# Patient Record
(demographics unavailable — no encounter records)

---

## 2024-12-18 NOTE — REASON FOR VISIT
[Follow-Up] : a follow-up visit for [Transposition of the Great Vessels] : transposition of the great vessels [Ventricular Septal Defect] : a ventricular septal defect [Parents] : parents

## 2024-12-23 NOTE — CARDIOLOGY SUMMARY
[LVSF ___%] : LV Shortening Fraction [unfilled]% [Today's Date] : [unfilled] [FreeTextEntry1] : Normal Sinus Rhythm with sinus arrhythmia Right Axis Deviation Right Bundle Branch Block QTc 406-433 ms  [de-identified] : 12/18/2024 [FreeTextEntry2] : Summary: 1. 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 2. Status post ventricular septal defect repair. 3. No residual ventricular septal defect. 4. Status post primary closure of interatrial septal defect. 5. No residual interatrial shunt identified. 6. The Main Pulmonary Artery has discrete narrowing. 7. Moderate supravalvar pulmonary stenosis. 8. Peak pulmonary valve gradient = 52.5 mmHg (mean grad = 31.4 mmHg). 9. Pulmonary valve gradients are actually right ventricular outflow gradients as they include the supravalvar stenosis. 10. Trivial neoaortic regurgitation. 11. 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 12. Right ventricular pressure elevated at 1/2+ systemic level. 13. Mild tricuspid valve regurgitation, peak systolic instantaneous gradient 47.6 mmHg. 14. Trivial mitral valve regurgitation. 15. Normal left ventricular size, morphology and systolic function. 16. No evidence of left ventricular outflow tract obstruction. 17. No pericardial effusion.  . [de-identified] : The results of the 24-hour Holter monitor placed at last visit reviewed in detail today. The heart rate ranged from  beats per minute with an average of 81 beats per minute. The predominant rhythm was normal sinus rhythm alternating with sinus bradycardia, sinus tachycardia and sinus arrhythmia. There were 2 supraventricular premature beats. There were no ventricular premature beats. There were no symptoms reported during the monitoring period. [de-identified] : 9/15/2021 [FreeTextEntry3] : done at [facility]. MPA gradient 30-35 mmHg.No branch pulmonary artery stenosis. RVp 2/3rd systemic. No intervention done due to the location of massimo-supravalvlar main pulmonary artery obstruction.

## 2024-12-23 NOTE — HISTORY OF PRESENT ILLNESS
[FreeTextEntry1] : MARIE presented for follow up on Dec 18, 2024. She was last evaluated on   Mar 20, 2024.   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 She has complete repair on 8/26/19 and was discharged on 9/11/2019.   She has been thriving at home, has been feeding without difficulty, and has been gaining weight and developing appropriately. There has been no tachypnea, increased work of breathing, cyanosis, diaphoresis, unexplained irritability, or syncope.   Ther have been no other interim medical problems. No recent episodes of croup.  On 12/3/2020 she had a sedated echo and MRI [place] [facility] [place] her massimo-supravalvar pulmonary stenosis. The MRI revealed: D- transposition of great arteries with ventricular septal defect s/p repair with arterial switch operation and [place] maneuver. Neopulmonary artery is anterior to the massimo aorta with the branch pulmonary arteries draped over the ascending aorta as expected with Branden procedure. The neopulmonary artery is a very short segment but measures within normal range (z: -1.23 for a mean dimension of 1.05 cm in cross section). Trivial neopulmonary insufficiency (RF:  8%). Mild decrease in caliber distally with dephasing jet artifact starting in the supravalvar region - pre bifurcation (likely site of anastomosis) that extends into the proximal right pulmonary artery. Mild to moderate proximal narrowing of the right pulmonary artery (z: -2.32 taking a mean dimension of 0.5 cm) with a slight increase in caliber distally. The left pulmonary artery also shows a mild increase in caliber distally as compared to the proximal portion with no discrete stenosis (z: 0.39 taking a mean dimension of 0.8 cm in the mid portion and z: -0.43 taking a mean dimension of 0.7 cm proximally). (All measurements given above). Estimated QpR: QpL is 24%: 76%. Mildly dilated neoaortic root (z: 2.67 taking a maximal dimension of 1.81 cm from sinus to commissure in systole, using [place] data set). No significant massimo aortic insufficiency. Patent origins of the reimplanted right and left coronary arteries. Normal biventricular volumes and ejection fractions.  The family went for second opinion to see if the supravalvar pulmonic stenosis could be treated via interventional cardiology. MARIE had her cardiac catheterization done at [facility] on 9/15/2021. The obstruction was found not amenable to cardiac catheter intervention. They advised not proceeding to surgery.   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 She also has left upper abdomen wound dehiscence, mid-back pressure ulcer, and right neck ulcerated wound.  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   She is being followed by Developmental Pediatrician.  She was last evaluated in August 2024.   MARIE has not been diagnosed with COVID-19 but has known exposure to the virus through dad.   There is no other family history of congenital heart disease, cardiomyopathies, arrhythmias, genetic heart disease or sudden cardiac death.

## 2024-12-23 NOTE — REVIEW OF SYSTEMS
[Nasal Stuffiness] : nasal congestion [Feeling Poorly] : not feeling poorly (malaise) [Fever] : no fever [Wgt Loss (___ Lbs)] : no recent weight loss [Pallor] : not pale [Eye Discharge] : no eye discharge [Redness] : no redness [Change in Vision] : no change in vision [Sore Throat] : no sore throat [Earache] : no earache [Loss Of Hearing] : no hearing loss [Nosebleeds] : no epistaxis [Cyanosis] : no cyanosis [Edema] : no edema [Diaphoresis] : not diaphoretic [Chest Pain] : no chest pain or discomfort [Exercise Intolerance] : no persistence of exercise intolerance [Palpitations] : no palpitations [Orthopnea] : no orthopnea [Fast HR] : no tachycardia [Tachypnea] : not tachypneic [Wheezing] : no wheezing [Cough] : no cough [Shortness Of Breath] : not expressed as feeling short of breath [Being A Poor Eater] : not a poor eater [Vomiting] : no vomiting [Diarrhea] : no diarrhea [Decrease In Appetite] : appetite not decreased [Abdominal Pain] : no abdominal pain [Fainting (Syncope)] : no fainting [Seizure] : no seizures [Headache] : no headache [Dizziness] : no dizziness [Limping] : no limping [Joint Pains] : no arthralgias [Joint Swelling] : no joint swelling [Rash] : no rash [Wound problems] : no wound problems [Skin Peeling] : no skin peeling [Easy Bruising] : no tendency for easy bruising [Swollen Glands] : no lymphadenopathy [Easy Bleeding] : no ~M tendency for easy bleeding [Sleep Disturbances] : ~T no sleep disturbances [Hyperactive] : no hyperactive behavior [Failure To Thrive] : no failure to thrive [Short Stature] : short stature was not noted [Jitteriness] : no jitteriness [Heat/Cold Intolerance] : no temperature intolerance [Dec Urine Output] : no oliguria

## 2024-12-23 NOTE — DISCUSSION/SUMMARY
[May participate in all age-appropriate activities] : [unfilled] May participate in all age-appropriate activities. [Influenza vaccine is recommended] : Influenza vaccine is recommended [FreeTextEntry1] : In summary MARIE 's work revealed:  Summary: 1. 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 2. Status post primary closure of interatrial septal defect. 3. Status post ventricular septal defect repair. 4. 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 5. No residual interatrial shunt identified. 6. No residual ventricular septal defect. 7. No evidence of left ventricular outflow tract obstruction. 8. Moderate supravalvar pulmonary stenosis. 9. Pulmonary valve gradients are actually right ventricular outflow gradients as they include the supravalvar stenosis. PSIG was 49.80 (averaged over multiple measurements from multiple views) and mean was 27.4. (Prior were 52.53 and 31.4) This is essentially unchanged.  10. The Main Pulmonary Artery has discrete narrowing. 11. Mild tricuspid valve regurgitation, peak systolic instantaneous gradient 39.0 mmHg. 12. Right ventricular pressure elevated at 1/2+ systemic level. 13. Mild mitral valve regurgitation. 14. Trivial neoaortic regurgitation.  Given the extensive cardiac surgery and RBBB on EKG she is at risk for arrhythmias. Her Holter was normal   To review: Her cardiac cath at [facility] on 9/15/2021 were consistent with these findings. As noted at prior visits, she will likely  need cardiac surgery for  RVOT obstruction in the future. However her RV pressures remain less than systemic so this can be delayed. I reached out to our Interventional cardiology  by Dr Jones to review the echo to see if he thought there may have been growth of the MPA for possible balloon/stent. He explained "Seems like the pulmonary valve/RVOT gradient has been relatively stable over time.  I think it would be reasonable to consider a CTA +/- lung perfusion scan vs repeat MRI at some point in the medium term to re-assess the MPA length and relationship to the RPA as the RPA flow was diminished on the MRI (24%).  Could then have a conversation about need for intervention - would certainly be easier now that she 4 years old"  We will consider this at her next visit when she is 5 years old .  I reminded parents that she needs to continue to follow up with developmental pediatrics.  She should continue with her routine pediatric care.   I would like to see KYARA for follow-up in 6 month, earlier as clinically indicated.  [Needs SBE Prophylaxis] : [unfilled] does not need bacterial endocarditis prophylaxis

## 2024-12-23 NOTE — DISCUSSION/SUMMARY
[May participate in all age-appropriate activities] : [unfilled] May participate in all age-appropriate activities. [Influenza vaccine is recommended] : Influenza vaccine is recommended [FreeTextEntry1] : In summary MARIE 's work revealed:  Summary: 1. 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 2. Status post primary closure of interatrial septal defect. 3. Status post ventricular septal defect repair. 4. 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 5. No residual interatrial shunt identified. 6. No residual ventricular septal defect. 7. No evidence of left ventricular outflow tract obstruction. 8. Moderate supravalvar pulmonary stenosis. 9. Pulmonary valve gradients are actually right ventricular outflow gradients as they include the supravalvar stenosis. PSIG was 49.80 (averaged over multiple measurements from multiple views) and mean was 27.4. (Prior were 52.53 and 31.4) This is essentially unchanged.  10. The Main Pulmonary Artery has discrete narrowing. 11. Mild tricuspid valve regurgitation, peak systolic instantaneous gradient 39.0 mmHg. 12. Right ventricular pressure elevated at 1/2+ systemic level. 13. Mild mitral valve regurgitation. 14. Trivial neoaortic regurgitation.  Given the extensive cardiac surgery and RBBB on EKG she is at risk for arrhythmias. Her Holter was normal   To review: Her cardiac cath at [facility] on 9/15/2021 were consistent with these findings. As noted at prior visits, she will likely  need cardiac surgery for  RVOT obstruction in the future. However her RV pressures remain less than systemic so this can be delayed. I reached out to our Interventional cardiology  by Dr Jones to review the echo to see if he thought there may have been growth of the MPA for possible balloon/stent. He explained "Seems like the pulmonary valve/RVOT gradient has been relatively stable over time.  I think it would be reasonable to consider a CTA +/- lung perfusion scan vs repeat MRI at some point in the medium term to re-assess the MPA length and relationship to the RPA as the RPA flow was diminished on the MRI (24%).  Could then have a conversation about need for intervention - would certainly be easier now that she 4 years old"  We will consider this at her next visit when she is 5 years old .  I reminded parents that she needs to continue to follow up with developmental pediatrics.  She should continue with her routine pediatric care.   I would like to see KYARA for follow-up in 6 month, earlier as clinically indicated.  [Needs SBE Prophylaxis] : [unfilled] does not need bacterial endocarditis prophylaxis

## 2024-12-23 NOTE — PHYSICAL EXAM
[General Appearance - Alert] : alert [General Appearance - In No Acute Distress] : in no acute distress [General Appearance - Well Nourished] : well nourished [General Appearance - Well Developed] : well developed [General Appearance - Well-Appearing] : well appearing [Appearance Of Head] : the head was normocephalic [Facies] : there were no dysmorphic facial features [Sclera] : the conjunctiva were normal [PERRL With Normal Accommodation] : the pupils were equal in size, round, and reactive to light [Outer Ear] : the ears and nose were normal in appearance [Nasal Cavity] : the nasal mucosa was normal [Oropharynx] : the oropharynx was normal [Respiration, Rhythm And Depth] : normal respiratory rhythm and effort [Auscultation Breath Sounds / Voice Sounds] : breath sounds clear to auscultation bilaterally [No Cough] : no cough [Stridor] : no stridor was observed [Chest Visual Inspection Thoracic Deformity] : no chest wall deformity [Apical Impulse] : quiet precordium with normal apical impulse [Heart Rate And Rhythm] : normal heart rate and rhythm [Heart Sounds] : normal S1 and S2 [Heart Sounds Gallop] : no gallops [Heart Sounds Pericardial Friction Rub] : no pericardial rub [Heart Sounds Click] : no clicks [Arterial Pulses] : normal upper and lower extremity pulses with no pulse delay [Edema] : no edema [Capillary Refill Test] : normal capillary refill [Systolic] : systolic [III] : a grade 3/6   [LMSB] : LMSB  [LUSB] : LUSB [Ejection] : ejection [Low] : low pitched [Harsh] : harsh [Early] : early [Back] : the murmur was transmitted to the back [Bowel Sounds] : normal bowel sounds [Abdomen Soft] : soft [Nondistended] : nondistended [Abdomen Tenderness] : non-tender [Musculoskeletal - Swelling] : no joint swelling seen [Nail Clubbing] : no clubbing  or cyanosis of the fingers [Musculoskeletal - Tenderness] : no joint tenderness was elicited [Musculoskeletal Exam: Normal Movement Of All Extremities] : normal movements of all extremities [Cervical Lymph Nodes Enlarged Anterior] : The anterior cervical nodes were normal [] : no rash [Skin Lesions] : no lesions [Skin Turgor] : normal turgor [Skin Color & Pigmentation] : normal skin color and pigmentation [Mood] : mood and affect were appropriate for age [Normal Chest Appearance] : the chest was normal in appearance [Chest Palpation Tender Sternum] : no chest wall tenderness [Sternotomy] : sternotomy [Clean] : clean [Dry] : dry [Well-Healed] : well-healed [Flat/Soft] : flat and soft [Motor Tone] : normal tone [FreeTextEntry1] : She has a scar on her back

## 2024-12-23 NOTE — CARDIOLOGY SUMMARY
[LVSF ___%] : LV Shortening Fraction [unfilled]% [Today's Date] : [unfilled] [FreeTextEntry1] : Normal Sinus Rhythm with sinus arrhythmia Right Axis Deviation Right Bundle Branch Block QTc 406-433 ms  [de-identified] : 12/18/2024 [FreeTextEntry2] : Summary: 1. 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 2. Status post ventricular septal defect repair. 3. No residual ventricular septal defect. 4. Status post primary closure of interatrial septal defect. 5. No residual interatrial shunt identified. 6. The Main Pulmonary Artery has discrete narrowing. 7. Moderate supravalvar pulmonary stenosis. 8. Peak pulmonary valve gradient = 52.5 mmHg (mean grad = 31.4 mmHg). 9. Pulmonary valve gradients are actually right ventricular outflow gradients as they include the supravalvar stenosis. 10. Trivial neoaortic regurgitation. 11. 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 12. Right ventricular pressure elevated at 1/2+ systemic level. 13. Mild tricuspid valve regurgitation, peak systolic instantaneous gradient 47.6 mmHg. 14. Trivial mitral valve regurgitation. 15. Normal left ventricular size, morphology and systolic function. 16. No evidence of left ventricular outflow tract obstruction. 17. No pericardial effusion.  . [de-identified] : The results of the 24-hour Holter monitor placed at last visit reviewed in detail today. The heart rate ranged from  beats per minute with an average of 81 beats per minute. The predominant rhythm was normal sinus rhythm alternating with sinus bradycardia, sinus tachycardia and sinus arrhythmia. There were 2 supraventricular premature beats. There were no ventricular premature beats. There were no symptoms reported during the monitoring period. [de-identified] : 9/15/2021 [FreeTextEntry3] : done at [facility]. MPA gradient 30-35 mmHg.No branch pulmonary artery stenosis. RVp 2/3rd systemic. No intervention done due to the location of massimo-supravalvlar main pulmonary artery obstruction.

## 2024-12-23 NOTE — HISTORY OF PRESENT ILLNESS
[FreeTextEntry1] : MARIE presented for follow up on Dec 18, 2024. She was last evaluated on   Mar 20, 2024.   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 She has complete repair on 8/26/19 and was discharged on 9/11/2019.   She has been thriving at home, has been feeding without difficulty, and has been gaining weight and developing appropriately. There has been no tachypnea, increased work of breathing, cyanosis, diaphoresis, unexplained irritability, or syncope.   Ther have been no other interim medical problems. No recent episodes of croup.  On 12/3/2020 she had a sedated echo and MRI and [facility] recommended surgery for her massimo-supravalvar pulmonary stenosis. The MRI revealed: D- transposition of great arteries with ventricular septal defect s/p repair with arterial switch operation and [place] maneuver. Neopulmonary artery is anterior to the massimo aorta with the branch pulmonary arteries draped over the ascending aorta as expected with Branden procedure. The neopulmonary artery is a very short segment but measures within normal range (z: -1.23 for a mean dimension of 1.05 cm in cross section). Trivial neopulmonary insufficiency (RF:  8%). Mild decrease in caliber distally with dephasing jet artifact starting in the supravalvar region - pre bifurcation (likely site of anastomosis) that extends into the proximal right pulmonary artery. Mild to moderate proximal narrowing of the right pulmonary artery (z: -2.32 taking a mean dimension of 0.5 cm) with a slight increase in caliber distally. The left pulmonary artery also shows a mild increase in caliber distally as compared to the proximal portion with no discrete stenosis (z: 0.39 taking a mean dimension of 0.8 cm in the mid portion and z: -0.43 taking a mean dimension of 0.7 cm proximally). (All measurements given above). Estimated QpR: QpL is 24%: 76%. Mildly dilated neoaortic root (z: 2.67 taking a maximal dimension of 1.81 cm from sinus to commissure in systole, using [place] data set). No significant massimo aortic insufficiency. Patent origins of the reimplanted right and left coronary arteries. Normal biventricular volumes and ejection fractions.  The family went for second opinion to see if the supravalvar pulmonic stenosis could be treated via interventional cardiology. MARIE had her cardiac catheterization done at [facility] on 9/15/2021. The obstruction was found not amenable to cardiac catheter intervention. They advised not proceeding to surgery.   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 She also has left upper abdomen wound dehiscence, mid-back pressure ulcer, and right neck ulcerated wound.  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   She is being followed by Developmental Pediatrician.  She was last evaluated in August 2024.   MARIE has not been diagnosed with COVID-19 but has known exposure to the virus through dad.   There is no other family history of congenital heart disease, cardiomyopathies, arrhythmias, genetic heart disease or sudden cardiac death.

## 2024-12-23 NOTE — CONSULT LETTER
[Today's Date] : [unfilled] [Name] : Name: [unfilled] [] : : ~~ [Today's Date:] : [unfilled] [Dear  ___:] : Dear Dr. [unfilled]: [Consult] : I had the pleasure of evaluating your patient, [unfilled]. My full evaluation follows. [Consult - Single Provider] : Thank you very much for allowing me to participate in the care of this patient. If you have any questions, please do not hesitate to contact me. [Sincerely,] : Sincerely, [FreeTextEntry4] : Padmini Rios, DO [FreeTextEntry5] : [address] [facility] [FreeTextEntry6] : [place]  [id number] [de-identified] : Barry E. Goldberg MD, FACC, FAAP, FASE\par  [facility]\par  [facility] [place] [facility] \par  Chief of Pediatric Cardiology\par

## 2024-12-23 NOTE — CONSULT LETTER
[Today's Date] : [unfilled] [Name] : Name: [unfilled] [] : : ~~ [Today's Date:] : [unfilled] [Dear  ___:] : Dear Dr. [unfilled]: [Consult] : I had the pleasure of evaluating your patient, [unfilled]. My full evaluation follows. [Consult - Single Provider] : Thank you very much for allowing me to participate in the care of this patient. If you have any questions, please do not hesitate to contact me. [Sincerely,] : Sincerely, [FreeTextEntry4] : Padmini Rios, DO [FreeTextEntry5] : [address] [facility] [FreeTextEntry6] : [place]  [id number] [de-identified] : Barry E. Goldberg MD, FACC, FAAP, FASE\par  [facility]\par  [facility] [place] [facility] \par  Chief of Pediatric Cardiology\par

## 2025-06-11 NOTE — CONSULT LETTER
[Today's Date] : [unfilled] [Name] : Name: [unfilled] [] : : ~~ [Today's Date:] : [unfilled] [Dear  ___:] : Dear Dr. [unfilled]: [Consult] : I had the pleasure of evaluating your patient, [unfilled]. My full evaluation follows. [Consult - Single Provider] : Thank you very much for allowing me to participate in the care of this patient. If you have any questions, please do not hesitate to contact me. [Sincerely,] : Sincerely, [FreeTextEntry4] : Padmini Rios, DO [FreeTextEntry5] : [address] [facility] [FreeTextEntry6] : [place]  [id number] [de-identified] : Barry E. Goldberg MD, FACC, FAAP, FASE\par  [facility]\par  [facility] [place] [facility] \par  Chief of Pediatric Cardiology\par

## 2025-06-11 NOTE — HISTORY OF PRESENT ILLNESS
[FreeTextEntry1] : MARIE presented for follow up on Jun 11, 2025. She was last evaluated on Dec 18, 2024..   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 She has complete repair on 8/26/19 and was discharged on 9/11/2019.   She went through a spring season of soccer without problems. She di field day without dyspnea on exertion.  She had URI and strep since last visit.  She has been otherwise thriving at home, has been feeding without difficulty, and has been gaining weight and developing appropriately. There has been no tachypnea, increased work of breathing, cyanosis, diaphoresis, unexplained irritability, or syncope.   Ther have been no other interim medical problems. No recent episodes of croup.  On 12/3/2020 [place] had a sedated echo and [place] [facility] [place] her massimo-supravalvar pulmonary stenosis. 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 Neopulmonary artery is anterior to the massimo aorta with the branch pulmonary arteries draped over the ascending aorta as expected with Branden procedure. The neopulmonary artery is a very short segment but measures within normal range (z: -1.23 for a mean dimension of 1.05 cm in cross section). Trivial neopulmonary insufficiency (RF:  8%). Mild decrease in caliber distally with dephasing jet artifact starting in the supravalvar region - pre bifurcation (likely site of anastomosis) that extends into the proximal right pulmonary artery. Mild to moderate proximal narrowing of the right pulmonary artery (z: -2.32 taking a mean dimension of 0.5 cm) with a slight increase in caliber distally. The left pulmonary artery also shows a mild increase in caliber distally as compared to the proximal portion with no discrete stenosis (z: 0.39 taking a mean dimension of 0.8 cm in the mid portion and z: -0.43 taking a mean dimension of 0.7 cm proximally). (All measurements given above). Estimated QpR: QpL is 24%: 76%. Mildly dilated neoaortic root (z: 2.67 taking a maximal dimension of 1.81 cm from sinus to commissure in systole, using [place] data set). No significant massimo aortic insufficiency. Patent origins of the reimplanted right and left coronary arteries. Normal biventricular volumes and ejection fractions.  The family went for second opinion to see if the supravalvar pulmonic stenosis could be treated via interventional cardiology. MARIE had her cardiac catheterization done at [facility] on 9/15/2021. The obstruction was found not amenable to cardiac catheter intervention. They advised not proceeding to surgery.   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 She also has left upper abdomen wound dehiscence, mid-back pressure ulcer, and right neck ulcerated wound.  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   She is being followed by Developmental Pediatrician.  She was last evaluated in August 2024.  She is in  without any issues.  MARIE has not been diagnosed with COVID-19 but has known exposure to the virus through dad.   Dad was diagnosed with SVT and was placed on medications. There is no other family history of congenital heart disease, cardiomyopathies, arrhythmias, genetic heart disease or sudden cardiac death.

## 2025-06-11 NOTE — HISTORY OF PRESENT ILLNESS
[FreeTextEntry1] : MARIE presented for follow up on Jun 11, 2025. She was last evaluated on Dec 18, 2024..   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 She has complete repair on 8/26/19 and was discharged on 9/11/2019.   She went through a spring season of soccer without problems. She di field day without dyspnea on exertion.  She had URI and strep since last visit.  She has been otherwise thriving at home, has been feeding without difficulty, and has been gaining weight and developing appropriately. There has been no tachypnea, increased work of breathing, cyanosis, diaphoresis, unexplained irritability, or syncope.   Ther have been no other interim medical problems. No recent episodes of croup.  On 12/3/2020 she had a sedated echo and MRI and [facility] [place] surgery for her massimo-supravalvar pulmonary stenosis. 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 Neopulmonary artery is anterior to the massimo aorta with the branch pulmonary arteries draped over the ascending aorta as expected with Branden procedure. The neopulmonary artery is a very short segment but measures within normal range (z: -1.23 for a mean dimension of 1.05 cm in cross section). Trivial neopulmonary insufficiency (RF:  8%). Mild decrease in caliber distally with dephasing jet artifact starting in the supravalvar region - pre bifurcation (likely site of anastomosis) that extends into the proximal right pulmonary artery. Mild to moderate proximal narrowing of the right pulmonary artery (z: -2.32 taking a mean dimension of 0.5 cm) with a slight increase in caliber distally. The left pulmonary artery also shows a mild increase in caliber distally as compared to the proximal portion with no discrete stenosis (z: 0.39 taking a mean dimension of 0.8 cm in the mid portion and z: -0.43 taking a mean dimension of 0.7 cm proximally). (All measurements given above). Estimated QpR: QpL is 24%: 76%. Mildly dilated neoaortic root (z: 2.67 taking a maximal dimension of 1.81 cm from sinus to commissure in systole, using [place] data set). No significant massimo aortic insufficiency. Patent origins of the reimplanted right and left coronary arteries. Normal biventricular volumes and ejection fractions.  The family went for second opinion to see if the supravalvar pulmonic stenosis could be treated via interventional cardiology. MARIE had her cardiac catheterization done at [facility] on 9/15/2021. The obstruction was found not amenable to cardiac catheter intervention. They advised not proceeding to surgery.   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 She also has left upper abdomen wound dehiscence, mid-back pressure ulcer, and right neck ulcerated wound.  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   She is being followed by Developmental Pediatrician.  She was last evaluated in August 2024.  She is in  without any issues.  MARIE has not been diagnosed with COVID-19 but has known exposure to the virus through dad.   Dad was diagnosed with SVT and was placed on medications. There is no other family history of congenital heart disease, cardiomyopathies, arrhythmias, genetic heart disease or sudden cardiac death.

## 2025-06-11 NOTE — DISCUSSION/SUMMARY
[May participate in all age-appropriate activities] : [unfilled] May participate in all age-appropriate activities. [Influenza vaccine is recommended] : Influenza vaccine is recommended [FreeTextEntry1] : In summary MARIE 's work revealed:  Summary: 1. 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 2. Status post primary closure of interatrial septal defect. 3. Status post ventricular septal defect repair. 4. 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 5. No residual interatrial shunt identified. 6. No residual ventricular septal defect. 7. No evidence of left ventricular outflow tract obstruction. 8. Moderate supravalvar pulmonary stenosis. 9. Pulmonary valve gradients are actually right ventricular outflow gradients as they include the supravalvar stenosis. PSIG was 49.80 (averaged over multiple measurements from multiple views) and mean was 27.4. (Prior were 52.53 and 31.4) This is essentially unchanged.  10. The Main Pulmonary Artery has discrete narrowing. 11. Mild tricuspid valve regurgitation, peak systolic instantaneous gradient 39.0 mmHg. 12. Right ventricular pressure elevated at 1/2+ systemic level. 13. Mild mitral valve regurgitation. 14. Trivial neoaortic regurgitation.  Given the extensive cardiac surgery and RBBB on EKG she is at risk for arrhythmias. Her Holter was normal   To review: Her cardiac cath at [facility] on 9/15/2021 were consistent with these findings. As noted at prior visits, she will likely  need cardiac surgery for  RVOT obstruction in the future. However her RV pressures remain less than systemic so this can be delayed. I reached out to our Interventional cardiology  by Dr Jones to review the echo to see if he thought there may have been growth of the MPA for possible balloon/stent. He explained "Seems like the pulmonary valve/RVOT gradient has been relatively stable over time.  I think it would be reasonable to consider a CTA +/- lung perfusion scan vs repeat MRI at some point in the medium term to re-assess the MPA length and relationship to the RPA as the RPA flow was diminished on the MRI (24%).  Could then have a conversation about need for intervention - would certainly be easier now that she 4 years old"  We will consider this at her next visit when she is 5 years old .  I reminded parents that she needs to continue to follow up with developmental pediatrics.  She should continue with her routine pediatric care.   I would like to see KYARA for follow-up in 6 month, earlier as clinically indicated.  [Needs SBE Prophylaxis] : [unfilled] does not need bacterial endocarditis prophylaxis

## 2025-06-11 NOTE — CARDIOLOGY SUMMARY
[Today's Date] : [unfilled] [FreeTextEntry1] : Normal Sinus Rhythm with sinus arrhythmia Right Axis Deviation Right Bundle Branch Block QTc 406-433 ms  [de-identified] : 12/18/2024 [FreeTextEntry2] : Summary: 1. D-TGA (transposition of the great arteries) with ventricular septal defect, status post arterial switch operation with [place] maneuver. 2. Status post ventricular septal defect repair. 3. No residual ventricular septal defect. 4. Status post primary closure of interatrial septal defect. 5. No residual interatrial shunt identified. 6. The Main Pulmonary Artery has discrete narrowing. 7. Moderate supravalvar pulmonary stenosis. 8. Peak pulmonary valve gradient = 52.5 mmHg (mean grad = 31.4 mmHg). 9. Pulmonary valve gradients are actually right ventricular outflow gradients as they include the supravalvar stenosis. 10. Trivial neoaortic regurgitation. 11. 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 12. Right ventricular pressure elevated at 1/2+ systemic level. 13. Mild tricuspid valve regurgitation, peak systolic instantaneous gradient 47.6 mmHg. 14. Trivial mitral valve regurgitation. 15. Normal left ventricular size, morphology and systolic function. 16. No evidence of left ventricular outflow tract obstruction. 17. No pericardial effusion.  . [de-identified] : The results of the 24-hour Holter monitor placed at last visit reviewed in detail today. The heart rate ranged from  beats per minute with an average of 81 beats per minute. The predominant rhythm was normal sinus rhythm alternating with sinus bradycardia, sinus tachycardia and sinus arrhythmia. There were 2 supraventricular premature beats. There were no ventricular premature beats. There were no symptoms reported during the monitoring period. [de-identified] : 9/15/2021 [FreeTextEntry3] : done at [facility]. MPA gradient 30-35 mmHg.No branch pulmonary artery stenosis. RVp 2/3rd systemic. No intervention done due to the location of massimo-supravalvlar main pulmonary artery obstruction.

## 2025-06-11 NOTE — CARDIOLOGY SUMMARY
[Today's Date] : [unfilled] [FreeTextEntry1] : Normal Sinus Rhythm with sinus arrhythmia Right Axis Deviation Right Bundle Branch Block QTc 406-433 ms  [de-identified] : 12/18/2024 [FreeTextEntry2] : Summary: 1. D-TGA (transposition of the great arteries) with ventricular septal defect, status post arterial switch operation with [place] maneuver. 2. Status post ventricular septal defect repair. 3. No residual ventricular septal defect. 4. Status post primary closure of interatrial septal defect. 5. No residual interatrial shunt identified. 6. The Main Pulmonary Artery has discrete narrowing. 7. Moderate supravalvar pulmonary stenosis. 8. Peak pulmonary valve gradient = 52.5 mmHg (mean grad = 31.4 mmHg). 9. Pulmonary valve gradients are actually right ventricular outflow gradients as they include the supravalvar stenosis. 10. Trivial neoaortic regurgitation. 11. 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 12. Right ventricular pressure elevated at 1/2+ systemic level. 13. Mild tricuspid valve regurgitation, peak systolic instantaneous gradient 47.6 mmHg. 14. Trivial mitral valve regurgitation. 15. Normal left ventricular size, morphology and systolic function. 16. No evidence of left ventricular outflow tract obstruction. 17. No pericardial effusion.  . [de-identified] : The results of the 24-hour Holter monitor placed at last visit reviewed in detail today. The heart rate ranged from  beats per minute with an average of 81 beats per minute. The predominant rhythm was normal sinus rhythm alternating with sinus bradycardia, sinus tachycardia and sinus arrhythmia. There were 2 supraventricular premature beats. There were no ventricular premature beats. There were no symptoms reported during the monitoring period. [de-identified] : 9/15/2021 [FreeTextEntry3] : done at [facility]. MPA gradient 30-35 mmHg.No branch pulmonary artery stenosis. RVp 2/3rd systemic. No intervention done due to the location of massimo-supravalvlar main pulmonary artery obstruction.

## 2025-06-11 NOTE — PHYSICAL EXAM
[General Appearance - Alert] : alert [General Appearance - In No Acute Distress] : in no acute distress [General Appearance - Well Nourished] : well nourished [General Appearance - Well Developed] : well developed [General Appearance - Well-Appearing] : well appearing [Appearance Of Head] : the head was normocephalic [Facies] : there were no dysmorphic facial features [Sclera] : the conjunctiva were normal [PERRL With Normal Accommodation] : the pupils were equal in size, round, and reactive to light [Outer Ear] : the ears and nose were normal in appearance [Nasal Cavity] : the nasal mucosa was normal [Oropharynx] : the oropharynx was normal [Respiration, Rhythm And Depth] : normal respiratory rhythm and effort [Auscultation Breath Sounds / Voice Sounds] : breath sounds clear to auscultation bilaterally [No Cough] : no cough [Stridor] : no stridor was observed [Chest Visual Inspection Thoracic Deformity] : no chest wall deformity [Apical Impulse] : quiet precordium with normal apical impulse [Heart Rate And Rhythm] : normal heart rate and rhythm [Heart Sounds] : normal S1 and S2 [Heart Sounds Gallop] : no gallops [Heart Sounds Pericardial Friction Rub] : no pericardial rub [Heart Sounds Click] : no clicks [Arterial Pulses] : normal upper and lower extremity pulses with no pulse delay [Edema] : no edema [Capillary Refill Test] : normal capillary refill [Systolic] : systolic [III] : a grade 3/6   [LMSB] : LMSB  [LUSB] : LUSB [Ejection] : ejection [Low] : low pitched [Harsh] : harsh [Early] : early [Back] : the murmur was transmitted to the back [Bowel Sounds] : normal bowel sounds [Abdomen Soft] : soft [Nondistended] : nondistended [Abdomen Tenderness] : non-tender [Musculoskeletal - Swelling] : no joint swelling seen [Musculoskeletal - Tenderness] : no joint tenderness was elicited [Nail Clubbing] : no clubbing  or cyanosis of the fingers [Musculoskeletal Exam: Normal Movement Of All Extremities] : normal movements of all extremities [Cervical Lymph Nodes Enlarged Anterior] : The anterior cervical nodes were normal [] : no rash [Skin Lesions] : no lesions [Skin Turgor] : normal turgor [Skin Color & Pigmentation] : normal skin color and pigmentation [Mood] : mood and affect were appropriate for age [Normal Chest Appearance] : the chest was normal in appearance [Chest Palpation Tender Sternum] : no chest wall tenderness [Sternotomy] : sternotomy [Clean] : clean [Dry] : dry [Well-Healed] : well-healed [Flat/Soft] : flat and soft [Motor Tone] : normal tone [FreeTextEntry1] : She has a scar on her back

## 2025-06-11 NOTE — REVIEW OF SYSTEMS
[Feeling Poorly] : not feeling poorly (malaise) [Fever] : no fever [Wgt Loss (___ Lbs)] : no recent weight loss [Pallor] : not pale [Eye Discharge] : no eye discharge [Redness] : no redness [Change in Vision] : no change in vision [Nasal Stuffiness] : no nasal congestion [Sore Throat] : no sore throat [Earache] : no earache [Loss Of Hearing] : no hearing loss [Nosebleeds] : no epistaxis [Cyanosis] : no cyanosis [Edema] : no edema [Diaphoresis] : not diaphoretic [Chest Pain] : no chest pain or discomfort [Exercise Intolerance] : no persistence of exercise intolerance [Palpitations] : no palpitations [Orthopnea] : no orthopnea [Fast HR] : no tachycardia [Tachypnea] : not tachypneic [Wheezing] : no wheezing [Cough] : no cough [Shortness Of Breath] : not expressed as feeling short of breath [Being A Poor Eater] : not a poor eater [Vomiting] : no vomiting [Diarrhea] : no diarrhea [Decrease In Appetite] : appetite not decreased [Abdominal Pain] : no abdominal pain [Fainting (Syncope)] : no fainting [Headache] : no headache [Seizure] : no seizures [Limping] : no limping [Dizziness] : no dizziness [Joint Pains] : no arthralgias [Joint Swelling] : no joint swelling [Rash] : no rash [Wound problems] : no wound problems [Skin Peeling] : no skin peeling [Easy Bruising] : no tendency for easy bruising [Easy Bleeding] : no ~M tendency for easy bleeding [Swollen Glands] : no lymphadenopathy [Sleep Disturbances] : ~T no sleep disturbances [Hyperactive] : no hyperactive behavior [Failure To Thrive] : no failure to thrive [Short Stature] : short stature was not noted [Jitteriness] : no jitteriness [Heat/Cold Intolerance] : no temperature intolerance [Dec Urine Output] : no oliguria

## 2025-06-11 NOTE — CONSULT LETTER
[Today's Date] : [unfilled] [Name] : Name: [unfilled] [] : : ~~ [Today's Date:] : [unfilled] [Dear  ___:] : Dear Dr. [unfilled]: [Consult] : I had the pleasure of evaluating your patient, [unfilled]. My full evaluation follows. [Consult - Single Provider] : Thank you very much for allowing me to participate in the care of this patient. If you have any questions, please do not hesitate to contact me. [Sincerely,] : Sincerely, [FreeTextEntry4] : Padmini Rios, DO [FreeTextEntry5] : [address] [facility] [FreeTextEntry6] : [place]  [id number] [de-identified] : Barry E. Goldberg MD, FACC, FAAP, FASE\par  [facility]\par  [facility] \par  Chief of Pediatric Cardiology\par